# Patient Record
Sex: FEMALE | Race: WHITE | NOT HISPANIC OR LATINO | Employment: FULL TIME | URBAN - METROPOLITAN AREA
[De-identification: names, ages, dates, MRNs, and addresses within clinical notes are randomized per-mention and may not be internally consistent; named-entity substitution may affect disease eponyms.]

---

## 2019-03-01 ENCOUNTER — HISTORICAL (OUTPATIENT)
Dept: ADMINISTRATIVE | Facility: HOSPITAL | Age: 44
End: 2019-03-01

## 2019-03-01 LAB
ABS NEUT (OLG): 7.7 X10(3)/MCL (ref 2.1–9.2)
ALBUMIN SERPL-MCNC: 4 GM/DL (ref 3.4–5)
ALBUMIN/GLOB SERPL: 1.18 {RATIO} (ref 1.5–2.5)
ALP SERPL-CCNC: 70 UNIT/L (ref 38–126)
ALT SERPL-CCNC: 16 UNIT/L (ref 7–52)
AST SERPL-CCNC: 13 UNIT/L (ref 15–37)
BILIRUB SERPL-MCNC: 0.4 MG/DL (ref 0.2–1)
BILIRUBIN DIRECT+TOT PNL SERPL-MCNC: 0.1 MG/DL (ref 0–0.5)
BILIRUBIN DIRECT+TOT PNL SERPL-MCNC: 0.3 MG/DL
BUN SERPL-MCNC: 10 MG/DL (ref 7–18)
CALCIUM SERPL-MCNC: 8.7 MG/DL (ref 8.5–10)
CHLORIDE SERPL-SCNC: 104 MMOL/L (ref 98–107)
CO2 SERPL-SCNC: 27 MMOL/L (ref 21–32)
CREAT SERPL-MCNC: 0.69 MG/DL (ref 0.6–1.3)
ERYTHROCYTE [DISTWIDTH] IN BLOOD BY AUTOMATED COUNT: 13 % (ref 11.5–17)
GLOBULIN SER-MCNC: 3.4 GM/DL (ref 1.2–3)
GLUCOSE SERPL-MCNC: 93 MG/DL (ref 74–106)
H PYLORI AB SER IA-ACNC: NEGATIVE
HCT VFR BLD AUTO: 40.1 % (ref 37–47)
HGB BLD-MCNC: 12.9 GM/DL (ref 12–16)
LYMPHOCYTES # BLD AUTO: 2.6 X10(3)/MCL (ref 0.6–3.4)
LYMPHOCYTES NFR BLD AUTO: 23.8 % (ref 13–40)
MCH RBC QN AUTO: 25.9 PG (ref 27–31.2)
MCHC RBC AUTO-ENTMCNC: 32 GM/DL (ref 32–36)
MCV RBC AUTO: 80 FL (ref 80–94)
MONOCYTES # BLD AUTO: 0.5 X10(3)/MCL (ref 0.1–1.3)
MONOCYTES NFR BLD AUTO: 4.4 % (ref 0.1–24)
NEUTROPHILS NFR BLD AUTO: 71.8 % (ref 47–80)
PLATELET # BLD AUTO: 432 X10(3)/MCL (ref 130–400)
PMV BLD AUTO: 8.9 FL (ref 9.4–12.4)
POTASSIUM SERPL-SCNC: 4 MMOL/L (ref 3.5–5.1)
PROT SERPL-MCNC: 7.4 GM/DL (ref 6.4–8.2)
RBC # BLD AUTO: 4.99 X10(6)/MCL (ref 4.2–5.4)
SODIUM SERPL-SCNC: 137 MMOL/L (ref 136–145)
WBC # SPEC AUTO: 10.8 X10(3)/MCL (ref 4.5–11.5)

## 2019-11-08 ENCOUNTER — HISTORICAL (OUTPATIENT)
Dept: LAB | Facility: HOSPITAL | Age: 44
End: 2019-11-08

## 2019-11-08 LAB — FSH SERPL-ACNC: 5.7 MIU/ML

## 2020-07-06 ENCOUNTER — HISTORICAL (OUTPATIENT)
Dept: ADMINISTRATIVE | Facility: HOSPITAL | Age: 45
End: 2020-07-06

## 2020-07-06 LAB
ABS NEUT (OLG): 8.9 X10(3)/MCL (ref 2.1–9.2)
ALBUMIN SERPL-MCNC: 4.1 GM/DL (ref 3.4–5)
ALBUMIN/GLOB SERPL: 1.28 {RATIO} (ref 1.5–2.5)
ALP SERPL-CCNC: 100 UNIT/L (ref 38–126)
ALT SERPL-CCNC: 21 UNIT/L (ref 7–52)
APPEARANCE, UA: CLEAR
AST SERPL-CCNC: 15 UNIT/L (ref 15–37)
BACTERIA #/AREA URNS AUTO: ABNORMAL /HPF
BILIRUB SERPL-MCNC: 0.2 MG/DL (ref 0.2–1)
BILIRUB UR QL STRIP: NEGATIVE MG/DL
BILIRUBIN DIRECT+TOT PNL SERPL-MCNC: 0.1 MG/DL
BILIRUBIN DIRECT+TOT PNL SERPL-MCNC: 0.1 MG/DL (ref 0–0.5)
BUN SERPL-MCNC: 10 MG/DL (ref 7–18)
CALCIUM SERPL-MCNC: 9.5 MG/DL (ref 8.5–10)
CHLORIDE SERPL-SCNC: 102 MMOL/L (ref 98–107)
CHOLEST SERPL-MCNC: 212 MG/DL (ref 0–200)
CHOLEST/HDLC SERPL: 5.6 {RATIO}
CO2 SERPL-SCNC: 25 MMOL/L (ref 21–32)
COLOR UR: YELLOW
CREAT SERPL-MCNC: 0.69 MG/DL (ref 0.6–1.3)
CREAT UR-MCNC: 100 MG/DL
DEPRECATED CALCIDIOL+CALCIFEROL SERPL-MC: 41.2 NG/ML (ref 30–80)
ERYTHROCYTE [DISTWIDTH] IN BLOOD BY AUTOMATED COUNT: 14 % (ref 11.5–17)
EST. AVERAGE GLUCOSE BLD GHB EST-MCNC: 120 MG/DL
GLOBULIN SER-MCNC: 3.2 GM/DL (ref 1.2–3)
GLUCOSE (UA): NEGATIVE MG/DL
GLUCOSE SERPL-MCNC: 97 MG/DL (ref 74–106)
HBA1C MFR BLD: 5.8 % (ref 4.4–6.4)
HCT VFR BLD AUTO: 39.5 % (ref 37–47)
HDLC SERPL-MCNC: 38 MG/DL (ref 35–60)
HGB BLD-MCNC: 12.2 GM/DL (ref 12–16)
HGB UR QL STRIP: ABNORMAL UNIT/L
KETONES UR QL STRIP: NEGATIVE MG/DL
LDLC SERPL CALC-MCNC: 148 MG/DL (ref 0–129)
LEUKOCYTE ESTERASE UR QL STRIP: NEGATIVE UNIT/L
LYMPHOCYTES # BLD AUTO: 3.3 X10(3)/MCL (ref 0.6–3.4)
LYMPHOCYTES NFR BLD AUTO: 25.4 % (ref 13–40)
MCH RBC QN AUTO: 24 PG (ref 27–31.2)
MCHC RBC AUTO-ENTMCNC: 31 GM/DL (ref 32–36)
MCV RBC AUTO: 78 FL (ref 80–94)
MICROALBUMIN UR-MCNC: 30 MG/L
MICROALBUMIN/CREAT RATIO PNL UR: <30 MG/GM
MONOCYTES # BLD AUTO: 0.7 X10(3)/MCL (ref 0.1–1.3)
MONOCYTES NFR BLD AUTO: 5.6 % (ref 0.1–24)
NEUTROPHILS NFR BLD AUTO: 69 % (ref 47–80)
NITRITE UR QL STRIP.AUTO: NEGATIVE
PH UR STRIP: 6.5 [PH]
PLATELET # BLD AUTO: 545 X10(3)/MCL (ref 130–400)
PMV BLD AUTO: 9.2 FL (ref 9.4–12.4)
POTASSIUM SERPL-SCNC: 4.5 MMOL/L (ref 3.5–5.1)
PROT SERPL-MCNC: 7.3 GM/DL (ref 6.4–8.2)
PROT UR QL STRIP: NEGATIVE MG/DL
RBC # BLD AUTO: 5.08 X10(6)/MCL (ref 4.2–5.4)
RBC #/AREA URNS HPF: ABNORMAL /HPF
SODIUM SERPL-SCNC: 138 MMOL/L (ref 136–145)
SP GR UR STRIP: >1.03
SQUAMOUS EPITHELIAL, UA: ABNORMAL /LPF
TRIGL SERPL-MCNC: 193 MG/DL (ref 30–150)
TSH SERPL-ACNC: 1.88 MIU/ML (ref 0.35–4.94)
UROBILINOGEN UR STRIP-ACNC: 0.2 MG/DL
VLDLC SERPL CALC-MCNC: 38.6 MG/DL
WBC # SPEC AUTO: 12.9 X10(3)/MCL (ref 4.5–11.5)
WBC #/AREA URNS AUTO: ABNORMAL /[HPF]

## 2021-03-30 LAB
PAP RECOMMENDATION EXT: NORMAL
PAP SMEAR: NORMAL

## 2022-04-07 ENCOUNTER — HISTORICAL (OUTPATIENT)
Dept: ADMINISTRATIVE | Facility: HOSPITAL | Age: 47
End: 2022-04-07

## 2022-04-23 VITALS
SYSTOLIC BLOOD PRESSURE: 124 MMHG | WEIGHT: 215.81 LBS | DIASTOLIC BLOOD PRESSURE: 69 MMHG | OXYGEN SATURATION: 96 % | HEIGHT: 61 IN | BODY MASS INDEX: 40.75 KG/M2

## 2022-05-01 NOTE — HISTORICAL [facility] CERNER
This is a historical note converted from Serjio. Formatting and pictures may have been removed.  Please reference Serjio for original formatting and attached multimedia. Chief Complaint  cpx  History of Present Illness  Patient is here for ?her?annual wellness -?labs not done, fasting. Denies any side effects to current medications.? Tolerating current meds and?feels that they are effective.? Denies change in social or family history.?  ?   She has not taken her Crestor 10mg?since February or March. She has just forgotten to take the medication. It did not cause her any SE.  ?   Anxiety: controlled. Uses clonazepam?sparingly.  ?   She reports a sinus congestion?with a wet cough?for the past 3 days. ?She denies?having a fever or?shortness of breath.  ?   Social Hx: Water:? 2-3 glasses/day,? Caff: 2-3 large mugs/day,? ETOH: occ, ?Tob: close to 1 ppd,? Exercise: swimming and walking with kids, ?Occupation: , ?Marital Status:??, ?Children: 1 son 12yo and 1 step daughter 6yo  ?   Family Hx:  Father: Unknown history.  Mother 62 yo: Eczema, hypertension, rosacea  2 sisters: Healthy  2 brothers: Healthy  ?  Surgical Hx:     ?  Specialists:  GI: Dr. Westbrook - started passing large amounts of blood in stool?in March, went to [facility], scheduled for colonoscopy on 2020.  GYN: Dr. Evans  ?????Last MMG:?overdue  ?????Last PAP: UTD  ?   Vaccinations:  Tetanus?- unknown  ?  Review of Systems  General:?? Patient reports energy level is ?fair. Denies weight change.??Denies fever,chills, night sweats, or weakness. ?Reports fatigue.  Integument:?? Denies any nevus changes, rashes, urticaria,??or sores.??Also denies itching or areas of numbness.  HEENT:?? Denies vision changes or eye pain.??No sore throat, ear pain, sinus pressure or discharge.  Cardiovascular:?? Denies chest pain, palpitations, dyspnea on exertion, orthopnea.  Respiratory:?? No cough, wheezing, shortness of breath, or sputum.  GI:??  Denies nausea, emesis, constipation, diarrhea, melena, hematochezia or abdominal pain  :?? No frequency, urgency, hematuria, discharge, or incontinence  MS:?? Denies myalgias, arthralgias, joint effusion, edema, or weakness  Neuro:?? No headaches, numbness in extremities, tingling, dizziness, or weakness  Psych:?? Denies anxiety, depression, suicidal or homicidal ideations, or irritability  Endo:?? Denies polyuria, polydipsia, polyphagia  Heme:?? No abnormal bleeding or bruising. No lymph node enlargement or pain.  ?  Physical Exam  Vitals & Measurements  HR:?84(Peripheral)? BP:?130/90?  HT:?155?cm? WT:?105.9?kg? BMI:?44.08?  General:?? Well-developed and??nourished, no apparent distress, alert and oriented??4.  Integument:?? Skin is intact with no erythema.??No pustules or vesicles.??No rash or scale. No Lymphadenopathy.??No urticaria.??No abnormal nevi.  HEENT:?? PERRLA, EOMI ; TMs and EACs clear, normal turbinates with no erythema, erythematous mucosa, no sinus tenderness; mild erythema of pharynx with postnasal drip.  Neck:?? Supple, no lymphadenopathy, no thyromegaly, no bruits, no jugular venous distention.  Cardiovascular:?? Regular rhythm and rate, no murmurs, radial and dorsal pedal pulses 2+ bilaterally.  Respiratory:?? Lungs clear to auscultation bilaterally, no wheezes, no crackles, no rhonchi.??Good air movement.  Abdomen:?? NABS, soft, nontender, no hepatosplenomegaly, no masses, no guarding or rebound.?  MS:?? No muscle tenderness, joints WN L, FROM, negative SLR, no?CCE.  Neuro:?? CN II-XII intact, reflexes 2+ throughout, no motor sensory deficits, negative cerebellar??tests.  Psych: Normal affect, patient calm, good historian, patient answers questions?appropriately. Good hygiene.  Heme:? No bruising or petechiae.  ?  Assessment/Plan  1.?Wellness examination?Z00.00  ?Age-appropriate wellness topics discussed. ?She was encouraged to?work on weight loss. ?Decrease carbohydrates and sugars.? Fiber  intake should be 25 to 30 g/day.? Increase water intake. ?Decrease caffeine intake.  Lab today: CBC, CMP, lipid panel, UA,?TSH  2.?Anxiety?F41.9  ?Controlled. ?Continue to use clonazepam sparingly. ?Clonazepam refilled 60?0.? Continue BuSpar?as needed for anxiety - 90?1  3.?Immunization due?Z23  She will return to update her Tdap?2?weeks after receiving her?Celestone injection.  4.?Vitamin D deficiency?E55.9  ?Lab today: Vitamin D  5.?Prediabetes?R73.03  ?Lab?today: A1c, MA  6.?Fatigue?R53.83  Weight loss and diet improvement encouraged.? Sleep hygiene discussed.  7.?Sinusitis?J32.9  Celestone 2 cc.  Increase water intake.? Balanced diet encouraged.  Referrals  Clinic Follow up, *Est. 01/06/21 8:15:00 CST, 6 month FU, Order for future visit, Fatigue, [facility]   Problem List/Past Medical History  Ongoing  Anxiety  Elevated glucose  GERD (gastroesophageal reflux disease)  Hypercholesteremia  Morbid obesity  No-show for appointment  Psoriasis  Sinusitis  Tobacco user  Vitamin D deficiency  Yeast vaginitis  Historical  No qualifying data  Procedure/Surgical History  c section (2008)   Medications  Bentyl, 20 mg= 2 mL, IM, Once  BuSpar 5 mg oral tablet, 5 mg= 1 tab(s), Oral, TID, PRN, 1 refills  Claritin 24 Hour Allergy 10 mg oral tablet, 10 mg= 1 tab(s), Oral, Daily  clonazePAM 0.5 mg oral tablet, 0.5 mg= 1 tab(s), Oral, BID, PRN  Normal Saline (0.9% NS) IV, 1000 mL, IV, Once  Zofran, 4 mg= 2 mL, IV Push, Once  Allergies  No Known Allergies  Social History  Abuse/Neglect  No, 07/06/2020  No, 11/08/2019  Alcohol  Current, 1-2 times per week, 03/28/2016  Substance Use  Never, 12/26/2017  Tobacco  4 or less cigarettes(less than 1/4 pack)/day in last 30 days, No, 07/06/2020  5-9 cigarettes (between 1/4 to 1/2 pack)/day in last 30 days, No, 06/10/2020  Smoker, current status unknown, Yes, 11/08/2019  4 or less cigarettes(less than 1/4 pack)/day in last 30 days, N/A, Ready to change: No. Household tobacco concerns: No.,  06/08/2019  Family History  Breast cancer: Aunt.  Eczema: Mother.  Hypertension.: Mother.  Renal cancer: Aunt.  Rosacea: Mother.  Skin cancer: Aunt and Uncle.  Father: History is unknown  Brother: History is negative  Sister: History is negative  Brother: History is negative  Sister: History is negative  Immunizations  Vaccine Date Status   influenza virus vaccine, inactivated 09/2018 Recorded   Health Maintenance  Health Maintenance  ???Pending?(in the next year)  ??? ??OverDue  ??? ? ? ?Alcohol Misuse Screening due??01/01/20??and every 1??year(s)  ??? ? ? ?Smoking Cessation due??01/01/20??and every 1??year(s)  ??? ??Due?  ??? ? ? ?ADL Screening due??07/08/20??and every 1??year(s)  ??? ? ? ?Depression Screening due??07/08/20??and every?  ??? ? ? ?Tetanus Vaccine due??07/08/20??and every 10??year(s)  ??? ??Due In Future?  ??? ? ? ?Obesity Screening not due until??01/01/21??and every 1??year(s)  ??? ? ? ?Blood Pressure Screening not due until??07/06/21??and every 1??year(s)  ??? ? ? ?Body Mass Index Check not due until??07/06/21??and every 1??year(s)  ??? ? ? ?Diabetes Screening not due until??07/06/21??and every 1??year(s)  ???Satisfied?(in the past 1 year)  ??? ??Satisfied?  ??? ? ? ?Blood Pressure Screening on??07/06/20.??Satisfied by Feliberto Chiu  ??? ? ? ?Body Mass Index Check on??07/06/20.??Satisfied by Feliberto Chiu  ??? ? ? ?Cervical Cancer Screening on??11/14/19.??Satisfied by Alma Chapman  ??? ? ? ?Diabetes Screening on??07/06/20.??Satisfied by Violeta Cabral  ??? ? ? ?Influenza Vaccine on??11/08/19.??Satisfied by Griselda Huggins LPN  ??? ? ? ?Lipid Screening on??07/06/20.??Satisfied by Violeta Cabral  ??? ? ? ?Obesity Screening on??07/06/20.??Satisfied by Feliberto Chiu  ?      Patient condition discussed?in detail with nurse practitioner.??Agree with plan of care?and follow-up.  ?

## 2022-05-01 NOTE — HISTORICAL [facility] CERNER
This is a historical note converted from Serjio. Formatting and pictures may have been removed.  Please reference Serjio for original formatting and attached multimedia. Chief Complaint  C/O bloating, pain, loose bowels, constipation x 2 weeks  History of Present Illness  Symptoms for 2 weeks. Bloating, abdominal pain. Loose stools. No change in diet. No recent travel. Nausea, no emesis.? Had a , o/w no other abd surgeries  Review of Systems  Denies cardiac or respiratory complaints. ?Denies  complaints.? Denies fever.  Physical Exam  Vitals & Measurements  HR:?74(Peripheral)? BP:?128/82?  HT:?109.5?cm? WT:?112.8?kg? BMI:?94.08?  General :?????Well-developed, obese female no apparent distress, alert and oriented ?4  Neck :?????Supple, no lymphadenopathy, no thyromegaly, no bruits, no jugular venous distention  Cardiovascular :?????? Regular rhythm and rate, no murmurs, radial and dorsal pedal pulses 2+ bilaterally  Respiratory :??????Lungs clear to auscultation bilaterally, no wheezes, no crackles, no rhonchi.? Good air movement  Abdomen :?????? ?NABS, soft, epigastric tenderness, no hepatosplenomegaly, no masses, no guarding or rebound????????????????????????  MS :??????? No muscle tenderness, joints WNL, FROM, negative SLR, no?CCE  Neuro :? ?Grossly intact  Heme :?????? No bruising or petechiae?  Back:??????? no CVAT  Assessment/Plan  1.?Abdominal pain?R10.9  ?Prescribed 7 days of Cipro and Levsin.? CMP pending. ?[place] diet next several days.? Encouraged to avoid dairy, wheat, caffeine, and soy.   Problem List/Past Medical History  Ongoing  Anxiety  Elevated glucose  GERD (gastroesophageal reflux disease)  Hypercholesteremia  Morbid obesity  Psoriasis  Sinusitis  Tobacco user  Vitamin D deficiency  Yeast vaginitis  Historical  No qualifying data  Procedure/Surgical History  c section   Medications  Bentyl, 20 mg= 2 mL, IM, Once  Claritin 24 Hour Allergy 10 mg oral tablet, 10 mg= 1 tab(s), Oral,  Daily  clonazePAM 0.5 mg oral tablet, 0.5 mg= 1 tab(s), Oral, BID, 1 refills  Levsin 0.125 mg oral tablet, 0.125 mg= 1 tab(s), Oral, q4hr, PRN  meloxicam 15 mg oral tablet, 15 mg= 1 tab(s), Oral, Daily  Normal Saline (0.9% NS) IV, 1000 mL, IV, Once  rosuvastatin 10 mg oral tablet, 10 mg= 1 tab(s), Oral, Daily, 1 refills  sertraline 100 mg oral tablet, See Instructions, 5 refills  Zofran, 4 mg= 2 mL, IV Push, Once  Allergies  No Known Allergies  Social History  Alcohol  Current, 1-2 times per week, 03/28/2016  Substance Abuse  Never, 12/26/2017  Tobacco  5-9 cigarettes (between 1/4 to 1/2 pack)/day in last 30 days, N/A, 03/01/2019  10 or more cigarettes (1/2 pack or more)/day in last 30 days, No, 01/14/2019  Current every day smoker, Cigarettes, N/A, 10/26/2018  Current every day smoker, Cigarettes, 03/28/2016  Family History  Breast cancer: Aunt.  Eczema: Mother.  Hypertension.: Mother.  Renal cancer: Aunt.  Rosacea: Mother.  Skin cancer: Aunt and Uncle.  Immunizations  Vaccine Date Status   influenza virus vaccine, inactivated 09/2018 Recorded   Health Maintenance  Health Maintenance  ???Pending?(in the next year)  ??? ??OverDue  ??? ? ? ?Diabetes Screening due??and every?  ??? ??Due?  ??? ? ? ?ADL Screening due??03/10/19??and every 1??year(s)  ??? ? ? ?Alcohol Misuse Screening due??03/10/19??and every 1??year(s)  ??? ? ? ?Depression Screening due??03/10/19??and every?  ??? ? ? ?Tetanus Vaccine due??03/10/19??and every 10??year(s)  ??? ??Due In Future?  ??? ? ? ?Smoking Cessation not due until??07/13/19??and every 1??year(s)  ??? ? ? ?Blood Pressure Screening not due until??02/29/20??and every 1??year(s)  ??? ? ? ?Body Mass Index Check not due until??02/29/20??and every 1??year(s)  ??? ? ? ?Obesity Screening not due until??03/01/20??and every 1??year(s)  ???Satisfied?(in the past 1 year)  ??? ??Satisfied?  ??? ? ? ?Blood Pressure Screening on??03/01/19.??Satisfied by Aliza Gresham CMA  ??? ? ? ?Body Mass Index  Check on??03/01/19.??Satisfied by Aliza Gresham CMA  ??? ? ? ?Breast Cancer Screening on??12/03/18.??Satisfied by Nicole Sotelo  ??? ? ? ?Cervical Cancer Screening on??10/24/18.??Satisfied by Jenny Bedoya  ??? ? ? ?Diabetes Screening on??03/01/19.??Satisfied by Dajuan Pinzon  ??? ? ? ?Influenza Vaccine on??09/01/18.??Satisfied by Aliza Gresham CMA  ??? ? ? ?Obesity Screening on??03/01/19.??Satisfied by Aliza Gresham CMA  ??? ? ? ?Smoking Cessation on??07/13/18.??Satisfied by Nallely An  ?  ?  Lab Results  Test Name Test Result Date/Time   WBC 10.8 x10(3)/mcL 03/01/2019 09:38 CST   Hgb 12.9 gm/dL 03/01/2019 09:38 CST   Hct 40.1 % 03/01/2019 09:38 CST   H pylori Ab Negative 03/01/2019 09:38 CST

## 2022-10-26 PROBLEM — L40.9 PSORIASIS: Status: ACTIVE | Noted: 2022-10-26

## 2022-10-26 PROBLEM — E66.01 MORBID OBESITY: Status: ACTIVE | Noted: 2022-10-26

## 2022-10-26 PROBLEM — R73.03 PREDIABETES: Status: ACTIVE | Noted: 2022-10-26

## 2022-10-26 PROBLEM — I10 HYPERTENSION: Status: ACTIVE | Noted: 2022-10-26

## 2022-10-26 PROBLEM — K21.9 GASTROESOPHAGEAL REFLUX DISEASE: Status: ACTIVE | Noted: 2022-10-26

## 2022-10-26 PROBLEM — F41.9 ANXIETY: Status: ACTIVE | Noted: 2022-10-26

## 2022-10-26 PROBLEM — Z72.0 TOBACCO USER: Status: ACTIVE | Noted: 2022-10-26

## 2022-10-26 PROBLEM — E55.9 VITAMIN D DEFICIENCY: Status: ACTIVE | Noted: 2022-10-26

## 2022-10-26 PROBLEM — E78.00 HYPERCHOLESTEROLEMIA: Status: ACTIVE | Noted: 2022-10-26

## 2022-12-29 ENCOUNTER — DOCUMENTATION ONLY (OUTPATIENT)
Dept: ADMINISTRATIVE | Facility: HOSPITAL | Age: 47
End: 2022-12-29

## 2023-09-25 ENCOUNTER — OFFICE VISIT (OUTPATIENT)
Dept: URGENT CARE | Facility: CLINIC | Age: 48
End: 2023-09-25
Payer: COMMERCIAL

## 2023-09-25 ENCOUNTER — TELEPHONE (OUTPATIENT)
Dept: URGENT CARE | Facility: CLINIC | Age: 48
End: 2023-09-25

## 2023-09-25 VITALS
DIASTOLIC BLOOD PRESSURE: 88 MMHG | TEMPERATURE: 98 F | HEIGHT: 61 IN | HEART RATE: 74 BPM | BODY MASS INDEX: 43.8 KG/M2 | SYSTOLIC BLOOD PRESSURE: 137 MMHG | WEIGHT: 232 LBS | OXYGEN SATURATION: 98 % | RESPIRATION RATE: 18 BRPM

## 2023-09-25 DIAGNOSIS — M79.661 RIGHT CALF PAIN: Primary | ICD-10-CM

## 2023-09-25 PROCEDURE — 99213 PR OFFICE/OUTPT VISIT, EST, LEVL III, 20-29 MIN: ICD-10-PCS | Mod: ,,,

## 2023-09-25 PROCEDURE — 99213 OFFICE O/P EST LOW 20 MIN: CPT | Mod: ,,,

## 2023-09-25 RX ORDER — MELOXICAM 15 MG/1
15 TABLET ORAL DAILY
Qty: 15 TABLET | Refills: 0 | Status: SHIPPED | OUTPATIENT
Start: 2023-09-25

## 2023-09-25 NOTE — PATIENT INSTRUCTIONS
Will call with ultrasound results once received.  Drink plenty of fluids. Get plenty of rest.     Tylenol as needed for pain.  Prescription anti-inflammatory called in to pharmacy.  Do not combine with over-the-counter Aleve, Advil or ibuprofen.    Go to the ER with any significant change or worsening of symptoms.   Follow up with your primary care doctor.

## 2023-09-25 NOTE — TELEPHONE ENCOUNTER
Called and spoke patient regarding negative ultrasound results.  Instructed to continue prescribed treatment plan and follow course of action.  Patient is to call back if symptoms fail to improve or if she develops any further symptoms.  Patient verbalized understanding and is thankful for the call.

## 2023-09-25 NOTE — PROGRESS NOTES
"Subjective:      Patient ID: Jessika Cervantes is a 47 y.o. female.    Vitals:  height is 5' 1" (1.549 m) and weight is 105.2 kg (232 lb). Her oral temperature is 98.3 °F (36.8 °C). Her blood pressure is 137/88 and her pulse is 74. Her respiration is 18 and oxygen saturation is 98%.     Chief Complaint: Muscle Pain     Patient is a 47 y.o. female who presents to urgent care with complaints of muscle spasm to right calf muscle x3 days ago. Tenderness and pain to area, hurts more when standing but loosens up as she begins walking.  Injury or trauma, shortness breath, chest pain, neck stiffness, rash.  Symptoms originally started as a muscle cramps "Charley horse" which she associated with being hot at a football game Friday.  Patient reports she has been hydrating a lot over the last few days.  Alleviating factors include pickle juice, epsom salt soak, leg cramp meds, ibuprofen with no relief.  Denies any other associated body aches.      Musculoskeletal:  Positive for muscle cramps and muscle ache.   Skin:  Negative for erythema.      Objective:     Physical Exam   Constitutional: She is oriented to person, place, and time. She appears well-developed.   HENT:   Head: Normocephalic and atraumatic. Head is without abrasion, without contusion and without laceration.   Ears:   Right Ear: External ear normal.   Left Ear: External ear normal.   Nose: Nose normal.   Mouth/Throat: Oropharynx is clear and moist and mucous membranes are normal.   Eyes: Conjunctivae, EOM and lids are normal. Pupils are equal, round, and reactive to light.   Neck: Trachea normal and phonation normal. Neck supple.   Cardiovascular: Normal rate, regular rhythm and normal heart sounds.   Pulmonary/Chest: Effort normal and breath sounds normal. No stridor. No respiratory distress.   Musculoskeletal: Normal range of motion.         General: Normal range of motion.      Right lower leg: She exhibits tenderness. She exhibits no bony tenderness and no " swelling. No edema.      Left lower leg: She exhibits no bony tenderness and no swelling. No edema.      Comments: Noted swelling or edema to right lower extremity comparisons left however there is significant tenderness to palpation of the calf, tightness posterior calf   Neurological: She is alert and oriented to person, place, and time.   Skin: Skin is warm, dry, intact and no rash. Capillary refill takes less than 2 seconds. No abrasion, No burn, No bruising, No erythema and No ecchymosis   Psychiatric: Her speech is normal and behavior is normal. Judgment and thought content normal.   Nursing note and vitals reviewed.      Assessment:     1. Right calf pain        Plan:       Right calf pain  -     Cancel: CV Ultrasound doppler venous DVT leg right; Future  -     meloxicam (MOBIC) 15 MG tablet; Take 1 tablet (15 mg total) by mouth once daily.  Dispense: 15 tablet; Refill: 0  -     US Lower Extremity Veins Right; Future      Discussed labs If there is worry for dehydration or other muscle aches and pains.  Patient denies at this time.  Reports she is been hydrating orally very well.  Will continue oral rehydration.     Patient has taken Mobic in the past for aches and pains, does not have any at this time and will need a refill.  Ultrasound placed of right lower extremity, set up for her to go to local outpatient imaging at 10:30 a.m. today.  Will call with ultrasound results.        Will call with ultrasound results once received.  Drink plenty of fluids. Get plenty of rest.     Tylenol as needed for pain.  Prescription anti-inflammatory called in to pharmacy.  Do not combine with over-the-counter Aleve, Advil or ibuprofen.    Go to the ER with any significant change or worsening of symptoms.   Follow up with your primary care doctor.